# Patient Record
Sex: MALE | Race: WHITE | URBAN - METROPOLITAN AREA
[De-identification: names, ages, dates, MRNs, and addresses within clinical notes are randomized per-mention and may not be internally consistent; named-entity substitution may affect disease eponyms.]

---

## 2022-07-21 ENCOUNTER — OFFICE VISIT (OUTPATIENT)
Dept: URBAN - METROPOLITAN AREA CLINIC 51 | Facility: CLINIC | Age: 22
End: 2022-07-21
Payer: MEDICAID

## 2022-07-21 DIAGNOSIS — H50.60 MECHANICAL STRABISMUS, UNSPECIFIED: ICD-10-CM

## 2022-07-21 DIAGNOSIS — H40.013 OPEN ANGLE WITH BORDERLINE FINDINGS, LOW RISK, BILATERAL: ICD-10-CM

## 2022-07-21 DIAGNOSIS — H52.13 MYOPIA, BILATERAL: ICD-10-CM

## 2022-07-21 DIAGNOSIS — H57.12 OCULAR PAIN, LEFT EYE: Primary | ICD-10-CM

## 2022-07-21 PROCEDURE — 99204 OFFICE O/P NEW MOD 45 MIN: CPT | Performed by: OPTOMETRIST

## 2022-07-21 PROCEDURE — 92134 CPTRZ OPH DX IMG PST SGM RTA: CPT | Performed by: OPTOMETRIST

## 2022-07-21 PROCEDURE — 92133 CPTRZD OPH DX IMG PST SGM ON: CPT | Performed by: OPTOMETRIST

## 2022-07-21 ASSESSMENT — VISUAL ACUITY
OD: 20/20
OS: 20/20

## 2022-07-21 ASSESSMENT — INTRAOCULAR PRESSURE
OS: 17
OD: 16

## 2022-07-21 ASSESSMENT — KERATOMETRY
OS: 42.70
OD: 42.25

## 2022-07-21 NOTE — IMPRESSION/PLAN
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 Plan: s/p Strabismus surgery. Full EOMs, no tropia noted today. No double vision unless 'relaxing eyes'. Monitor.

## 2022-07-21 NOTE — IMPRESSION/PLAN
Impression: Myopia, bilateral: H52.13. Plan: Inquired about Lasik. Discussed prescription meets parameters as long as prescription has been stable. Consult Dr. Smita Pringle if interested in pursuing.

## 2022-07-21 NOTE — IMPRESSION/PLAN
Impression: Open angle with borderline findings, low risk, bilateral: H40.013.
  -no trauma, concussion, injury hx Plan: Large c/d + asymmetry. IOPs withing average range. GCA shows no loss. RNFL bdl. Recommend RTC for baseline HVF.

## 2022-07-21 NOTE — IMPRESSION/PLAN
Impression: Ocular pain, left eye: H57.12. Plan: Started ~ 1 week ago. Lasts minutes and located above left eye orbit, below brow bone. Hx of headaches. No vision changes. No ocular etiology noted today. Will f/u at [facility] appt, if worsening/no improvement/recurrence can consider further eval with neurology (updated CT brain/orbit).

## 2024-11-20 ENCOUNTER — OFFICE VISIT (OUTPATIENT)
Dept: URBAN - METROPOLITAN AREA CLINIC 56 | Facility: LOCATION | Age: 24
End: 2024-11-20
Payer: COMMERCIAL

## 2024-11-20 DIAGNOSIS — H50.332 INTERMITTENT MONOCULAR EXOTROPIA, LEFT EYE: Primary | ICD-10-CM

## 2024-11-20 DIAGNOSIS — H52.13 MYOPIA, BILATERAL: ICD-10-CM

## 2024-11-20 PROCEDURE — 92004 COMPRE OPH EXAM NEW PT 1/>: CPT | Performed by: OPHTHALMOLOGY

## 2024-11-20 ASSESSMENT — INTRAOCULAR PRESSURE
OD: 18
OS: 18

## 2024-11-20 ASSESSMENT — KERATOMETRY
OS: 42.69
OD: 42.35

## 2024-11-20 ASSESSMENT — VISUAL ACUITY
OS: 20/20
OD: 20/20